# Patient Record
Sex: FEMALE | Race: WHITE
[De-identification: names, ages, dates, MRNs, and addresses within clinical notes are randomized per-mention and may not be internally consistent; named-entity substitution may affect disease eponyms.]

---

## 2019-11-10 ENCOUNTER — HOSPITAL ENCOUNTER (EMERGENCY)
Dept: HOSPITAL 62 - ER | Age: 34
Discharge: HOME | End: 2019-11-10
Payer: MEDICAID

## 2019-11-10 VITALS — DIASTOLIC BLOOD PRESSURE: 84 MMHG | SYSTOLIC BLOOD PRESSURE: 127 MMHG

## 2019-11-10 DIAGNOSIS — M79.672: ICD-10-CM
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
## 2019-11-10 DIAGNOSIS — F17.210: ICD-10-CM

## 2019-11-10 DIAGNOSIS — S99.922A: Primary | ICD-10-CM

## 2019-11-10 PROCEDURE — 73630 X-RAY EXAM OF FOOT: CPT

## 2019-11-10 PROCEDURE — 99283 EMERGENCY DEPT VISIT LOW MDM: CPT

## 2019-11-10 NOTE — ER DOCUMENT REPORT
HPI





- HPI


Patient complains to provider of: left foot pain


Time Seen by Provider: 11/10/19 08:51


Onset: Just prior to arrival


Pain Level: 3


Context: 





This 34-year-old female presents to the emergency department with complaints of 

left foot pain.  Reports she slipped when she was in the bathtub.  She reports 

she had used baby oil yesterday in the tub and forgot about it and slipped and 

hit her left foot against the wall of the bathtub.  She did not hit her head.  

Reports pain immediately pain with walking pain just sitting there.  Denies past

medical history of injury to the foot. 





- REPRODUCTIVE


Reproductive: DENIES: Pregnant:





- MUSCULOSKELETAL


Musculoskeletal: REPORTS: Extremity pain





Past Medical History





- General


Information source: Patient


Last Menstrual Period: A few days ago





- Social History


Smoking Status: Current Every Day Smoker


Cigarette use (# per day): Yes


Frequency of alcohol use: None


Drug Abuse: None


Occupation: homemaker


Lives with: Family


Family History: Reviewed & Not Pertinent


Patient has suicidal ideation: No


Patient has homicidal ideation: No


Neurological Medical History: Reports: Hx Migraine


Renal/ Medical History: Denies: Hx Peritoneal Dialysis


Psychiatric Medical History: Reports: Hx Anxiety


Surgical Hx: Negative





- Immunizations


Immunizations up to date: Yes





Vertical Provider Document





- CONSTITUTIONAL


Agree With Documented VS: Yes


Exam Limitations: No Limitations


General Appearance: WD/WN, Mild Distress - Grimaces





- INFECTION CONTROL


TRAVEL OUTSIDE OF THE [place] IN LAST 30 DAYS: No





- HEENT


HEENT: Atraumatic, Normocephalic





- NECK


Neck: Supple





- RESPIRATORY


Respiratory: No Respiratory Distress





- CARDIOVASCULAR


Cardiovascular: Regular Rate





- MUSCULOSKELETAL/EXTREMETIES


Musculoskeletal/Extremeties: MAEW, FROM, Tender - Left lateral  to 

palpate no obvious deformity good pedal pulse cap refill less than 3 seconds.





- NEURO


Level of Consciousness: Awake, Alert, Appropriate


Motor/Sensory: No Motor Deficit





- DERM


Integumentary: Warm, Dry


Adult Front & Back Diagram: 


                            __________________________














                            __________________________





 1 - pt reports pain








Course





- Re-evaluation


Re-evalutation: 





11/10/19 09:00


34-year-old female with left foot pain after she had onset of the bathtub.  Has 

not taken anything for pain Motrin offered and accepted.  Patient taken to x-

ray.


11/10/19 09:44


                                        





Foot X-Ray  11/10/19 08:54


IMPRESSION:  NEGATIVE STUDY OF THE LEFT FOOT. NO RADIOGRAPHIC EVIDENCE OF ACUTE 

INJURY.


 








X-ray negative.  Patient will be placed in Ace wrap crutches and instructed to 

follow-up with orthopedics for continued pain.





- Vital Signs


Vital signs: 


                                        











Temp Pulse Resp BP Pulse Ox


 


 97.6 F   91   14   125/79   97 


 


 11/10/19 08:37  11/10/19 08:37  11/10/19 08:37  11/10/19 08:37  11/10/19 08:37














- Diagnostic Test


Radiology reviewed: Image reviewed, Reports reviewed





Procedures





- Immobilization


  ** Left Foot


Pre-Proc Neuro Vasc Exam: Normal


Immobilizer type: Ace wrap


Performed by: RICHI Scott


Post-Proc Neuro Vasc Exam: Unchanged from pre-exam


Alignment checked and good: Yes





Discharge





- Discharge


Clinical Impression: 


Injury of left foot


Qualifiers:


 Encounter type: initial encounter Qualified Code(s): S99.922A - Unspecified 

injury of left foot, initial encounter





Condition: Stable


Disposition: HOME, SELF-CARE


Instructions:  Ace Wrap ([facility]), Use of Crutches ([facility]), Use of Over-The-Counter 

Ibuprofen ([facility]), Ice & Elevation ([facility])


Additional Instructions: 


*You have been evaluated for a foot injury


Your foot x-ray was negative for a fracture


*Rest/Ice/Elevate your foot


*Maintain the ace wrap and  use your crutches for the next few days for comfort


*Follow up with your primary care provider within 1 week for referral to 

orthopedics as indicated


*Take ibuprofen as indicated


*Return to ED for worsening condition, changes, needs





Referrals: 


ESTEBAN BARBOUR PA-C [Primary Care Provider] - Follow up in 3-5 days

## 2019-11-10 NOTE — RADIOLOGY REPORT (SQ)
EXAM DESCRIPTION:  FOOT LEFT COMPLETE



COMPLETED DATE/TIME:  11/10/2019 9:02 am



REASON FOR STUDY:  fall pain



COMPARISON:  None.



NUMBER OF VIEWS:  Three views.



TECHNIQUE:  AP, lateral and oblique  radiographic images acquired of the left foot.



LIMITATIONS:  None.



FINDINGS:  MINERALIZATION: Normal.

BONES: No acute fracture or dislocation.  No worrisome bone lesions.

JOINTS: No effusions.

SOFT TISSUES: No soft tissue swelling.  No foreign body.

OTHER: No other significant finding.



IMPRESSION:  NEGATIVE STUDY OF THE LEFT FOOT. NO RADIOGRAPHIC EVIDENCE OF ACUTE INJURY.



TECHNICAL DOCUMENTATION:  JOB ID:  [id number]

 2011 [employer]- All Rights Reserved



Reading location - IP/workstation name: [email or web address]

## 2020-03-16 ENCOUNTER — HOSPITAL ENCOUNTER (EMERGENCY)
Dept: HOSPITAL 62 - ER | Age: 35
Discharge: HOME | End: 2020-03-16
Payer: MEDICAID

## 2020-03-16 VITALS — SYSTOLIC BLOOD PRESSURE: 146 MMHG | DIASTOLIC BLOOD PRESSURE: 80 MMHG

## 2020-03-16 DIAGNOSIS — K08.89: Primary | ICD-10-CM

## 2020-03-16 DIAGNOSIS — Z87.891: ICD-10-CM

## 2020-03-16 PROCEDURE — 99282 EMERGENCY DEPT VISIT SF MDM: CPT

## 2020-03-16 NOTE — ER DOCUMENT REPORT
HPI





- HPI


Time Seen by Provider: 03/16/20 19:21


Pain Level: 4


Notes: 





34-year-old female patient presenting to the emergency department chief 

complaint of dental pain.  Patient reports she has multiple broken teeth and has

$8000 worth of dental work to be completed.  She states she is having pain and 

needs antibiotics.


She denies any fevers.





- REPRODUCTIVE


Reproductive: DENIES: Pregnant:





Past Medical History





- General


Information source: Patient





- Social History


Smoking Status: Former Smoker


Family History: Reviewed & Not Pertinent


Patient has suicidal ideation: No


Patient has homicidal ideation: No


Neurological Medical History: Reports: Hx Migraine


Renal/ Medical History: Denies: Hx Peritoneal Dialysis


Psychiatric Medical History: Reports: Hx Anxiety





- Immunizations


Immunizations up to date: Yes





Vertical Provider Document





- CONSTITUTIONAL


Notes: 





PHYSICAL EXAMINATION:





GENERAL: Well-appearing, well-nourished and in no acute distress.





HEAD: Atraumatic, normocephalic.





EYES: Pupils equal round extraocular movements intact,  conjunctiva are normal.





ENT: Nares patent, poor dentition noted throughout.  Multiple broken teeth noted

throughout.





NECK: Normal range of motion





LUNGS: No respiratory distress





Musculoskeletal: Normal range of motion





NEUROLOGICAL:  Normal speech, normal gait. 





PSYCH: Normal mood, normal affect.





SKIN: Warm, Dry, normal turgor, no rashes or lesions noted.





- INFECTION CONTROL


TRAVEL OUTSIDE OF THE [place] IN LAST 30 DAYS: No





Course





- Re-evaluation


Re-evalutation: 





Patient will be started on antibiotics.  I gave her 1 dose of pain medication 

here in the emergency department.  I instructed her that we do not prescribe 

narcotics for pain.  She will need to follow-up with her dentist for further 

management of her dental pain.








- Vital Signs


Vital signs: 


                                        











Temp Pulse Resp BP Pulse Ox


 


 98.2 F   88   16   146/80 H  96 


 


 03/16/20 19:15  03/16/20 19:15  03/16/20 19:15  03/16/20 19:15  03/16/20 19:15














Discharge





- Discharge


Clinical Impression: 


 Pain, dental





Condition: Stable


Disposition: HOME, SELF-CARE


Additional Instructions: 


You have been seen for dental pain.  It is very important that you follow-up 

with a dentist for definitive care.  Please return if you develop fever greater 

than 101, swelling in your face, vomiting, difficulty breathing or swallowing, 

or any other symptoms that are concerning to you.  For pain you should take 

ibuprofen 800 mg every 8 hours as needed.





[facility]


[phone]


Prescriptions: 


Clindamycin HCl 300 mg PO TID #30 capsule


Referrals: 


ESTEBAN BARBOUR PA-C [Primary Care Provider] - Follow up as needed

## 2020-04-22 ENCOUNTER — HOSPITAL ENCOUNTER (EMERGENCY)
Dept: HOSPITAL 62 - ER | Age: 35
Discharge: HOME | End: 2020-04-22
Payer: MEDICAID

## 2020-04-22 VITALS — SYSTOLIC BLOOD PRESSURE: 141 MMHG | DIASTOLIC BLOOD PRESSURE: 94 MMHG

## 2020-04-22 DIAGNOSIS — K08.89: Primary | ICD-10-CM

## 2020-04-22 DIAGNOSIS — K02.9: ICD-10-CM

## 2020-04-22 PROCEDURE — 99282 EMERGENCY DEPT VISIT SF MDM: CPT

## 2020-04-22 NOTE — ER DOCUMENT REPORT
ED Oral Problem





- General


Chief Complaint: Toothache


Stated Complaint: TOOTHACHE - RIGHT SIDE


Time Seen by Provider: 04/22/20 14:13


Primary Care Provider: 


ESTEBAN BARBOUR PA-C [Primary Care Provider] - Follow up as needed


Mode of Arrival: Ambulatory


Information source: Patient


Notes: 





34-year-old female presented to ED for complaint of painful tooth on the lower 

right side.  She has multiple decayed teeth.  She states this is because she has

a extreme fear of dentist and has not been going to the dentist due to this 

fear.  She states she was told by her dentist that when you get a cavity in one 

tooth that it causes all of the other 2 tooth decay.  She states she broke the 

tooth that is painful now yesterday.  The cavity was already in the tooth before

it was broken.  She states she does have a appointment with a dentist in [place] next Thursday due to the coronavirus she cannot get a dental appointment 

before then.  Patient has been treated with 1 Norco in the emergency room, 

clindamycin in the emergency room and a prescription for clindamycin and viscous

lidocaine for her discomfort.  Patient will be discharged home after this 

treatment.


TRAVEL OUTSIDE OF THE [place] IN LAST 30 DAYS: No





- HPI


Patient complains to provider of: Toothache


Onset: Yesterday


Onset: Gradual


Quality of pain: Throbbing


Severity: Moderate


Pain Level: 4


Sore throat: Moderate


Swollen jaw/face: Moderate


Associated symptoms: Toothache


Worsened by: Cold


Relieved by: Nothing


Similar symptoms previously: Yes


Recently seen / treated by doctor/dentist: No





- Related Data


Allergies/Adverse Reactions: 


                                        





Penicillins Allergy (Verified 04/22/20 14:00)


   








Home Medications: paxil, synthroid





Past Medical History





- Social History


Smoking Status: Never Smoker


Chew tobacco use (# tins/day): No


Frequency of alcohol use: None


Drug Abuse: None


Lives with: Family


Family History: Reviewed & Not Pertinent


Patient has suicidal ideation: No


Patient has homicidal ideation: No





- Past Medical History


Cardiac Medical History: Reports: None


Pulmonary Medical History: Reports: None


EENT Medical History: Reports: None


Neurological Medical History: Reports: Hx Migraine


Endocrine Medical History: Reports: Hx Hypothyroidism


Renal/ Medical History: Reports: None


Malignancy Medical History: Reports: None


GI Medical History: Reports: None


Musculoskeletal Medical History: Reports None


Skin Medical History: Reports None


Psychiatric Medical History: Reports: Hx Anxiety


Traumatic Medical History: Reports: None


Infectious Medical History: Reports: None


Surgical Hx: Negative


Past Surgical History: Reports: None





- Immunizations


Immunizations up to date: Yes





Review of Systems





- Review of Systems


Constitutional: No symptoms reported


EENT: Dental problem


Cardiovascular: No symptoms reported


Respiratory: No symptoms reported


Gastrointestinal: No symptoms reported


Genitourinary: No symptoms reported


Female Genitourinary: No symptoms reported


Musculoskeletal: No symptoms reported


Skin: No symptoms reported


Hematologic/Lymphatic: No symptoms reported


Neurological/Psychological: No symptoms reported





Physical Exam





- Vital signs


Vitals: 


                                        











Temp Pulse Resp BP Pulse Ox


 


 98.4 F   66   18   153/98 H  100 


 


 04/22/20 13:39  04/22/20 13:39  04/22/20 13:39  04/22/20 13:39  04/22/20 13:39











Interpretation: Normal





- General


General appearance: Appears well, Alert





- HEENT


Head: Normocephalic, Atraumatic


Eyes: Normal


Pupils: PERRL


Ears: Normal


External canal: Normal


Tympanic membrane: Normal


Sinus: Normal


Nasal: Normal


Mouth/Lips: Caries


Mucous membranes: Normal


Teeth diagram: 


                            __________________________














                            __________________________





 1 - All of her teeth are very decayed she has one tooth in the bottom right 

that is more painful as it just recently broke





Pharynx: Normal


Neck: Normal





- Respiratory


Respiratory status: No respiratory distress


Chest status: Nontender


Breath sounds: Normal


Chest palpation: Normal





- Cardiovascular


Rhythm: Regular


Heart sounds: Normal auscultation


Murmur: No





- Abdominal


Inspection: Normal


Distension: No distension


Bowel sounds: Normal


Tenderness: Nontender


Organomegaly: No organomegaly





- Back


Back: Normal, Nontender





- Extremities


General upper extremity: Normal inspection, Nontender, Normal color, Normal ROM,

Normal temperature


General lower extremity: Normal inspection, Nontender, Normal color, Normal ROM,

Normal temperature, Normal weight bearing.  No: Jocelynn's sign





- Neurological


Neuro grossly intact: Yes


Cognition: Normal


Orientation: AAOx4


[place] Coma Scale Eye Opening: Spontaneous


[place] Coma Scale Verbal: Oriented


[place] Coma Scale Motor: Obeys Commands


Janell Coma Scale Total: 15


Speech: Normal


Motor strength normal: LUE, RUE, LLE, RLE


Sensory: Normal





- Psychological


Associated symptoms: Normal affect, Normal mood





- Skin


Skin Temperature: Warm


Skin Moisture: Dry


Skin Color: Normal





Course





- Vital Signs


Vital signs: 


                                        











Temp Pulse Resp BP Pulse Ox


 


 98.4 F   66   18   141/94 H  100 


 


 04/22/20 14:01  04/22/20 13:39  04/22/20 13:39  04/22/20 14:20  04/22/20 13:39














Discharge





- Discharge


Clinical Impression: 


 Pain due to dental caries





Condition: Stable


Disposition: HOME, SELF-CARE


Additional Instructions: 


TOOTHACHE:





     Your pain is due to dental decay.  The tooth must be repaired in order for 

you to feel better.  You will, therefore, be referred to a dentist.  We do not 

have dentists on the staff at [facility].


     Severe swelling or drainage around a tooth usually means a dental abscess. 

This also requires evaluation and treatment by the dentist, but antibiotics may 

be prescribed while awaiting dental treatment.


     You should be rechecked immediately if you develop major swelling of the 

face, increasing pain, a lump in the jaw or gums, headache, difficulty 

swallowing, or fever.








ORAL NARCOTIC MEDICATION:


     You have been given a norco for pain control.  This medication is a 

narcotic.  It's best taken with food, as nausea can result if taken on an empty 

stomach.


     Don't operate machinery or drive within six hours of taking this 

medication.  Do not combine this medicine with alcohol, or with any medication 

which can cause sedation (such as cold tablets or sleeping pills) unless you get

permission from the physician.


     Narcotics tend to cause constipation.  If possible, drink plenty of fluids 

and eat a diet high in fiber and fruits.





     Please be aware that prescription narcotics also have the potential for 

abuse.  People become addicted to these medications because of the general sense

of wellbeing that they induce.  This feeling along with a significant reduction 

in tension, anxiety, and aggression provides a stimulating seductive quality to 

these drugs.  Once your pain is under control, we encourage you to discard your 

unused narcotics.





CLINDAMYCIN:


     You have been given a prescription for the antibiotic clindamycin.  It is 

often prescribed for infections in the mouth, such as dental infections or 

abscesses, and for skin infections due to MRSA.  It's important that you take 

all the medication, unless instructed otherwise by your physician.  Failure to 

complete the entire course can result in relapse of your condition.


     Common side effects of antibiotics include nausea, intestinal cramping, or 

diarrhea.  Women may develop vaginal yeast infections, and babies can get yeast 

(thrush) in the mouth following the use of antibiotics.  Contact your physician 

if you develop significant side effects from this medication.


     Allergy to this antibiotic can result in hives, wheezing, faintness, or 

itching.  If symptoms of allergy occur, stop the medication and call the doctor.





You were given a syringe of viscous lidocaine.  Place a small amount of this on 

your finger and apply it to the tooth that is painful.  You can do this every 4 

hours if you do it more often than every 4 hours it will erode the skin on your 

gums.  This will make the pain worse.


FOLLOW-UP CARE:


     You have been referred for follow-up care to the dentists listed below.  

Call the dentists office for an appointment as you were instructed or within 

the next two days.  If you experience worsening or a significant change in your 

symptoms, notify the physician immediately or return to the Emergency Department

at any time for re-evaluation.








[facility]


[address] [place] [address] [phone]





[facility]


[address]


[phone]





[facility]


[address] [facility] [facility] [address] [facility]


[phone]





[address] [place]


[phone]


[email or web address]





[place] [address] [place]  [address] [phone]


Monday-Thursdays  8:00am to 5:00 pm


Will see patients from other [facility].


Charges based on income and family size and


accepts Medicare, Medicaid, and Insurances


Will pull molars





[facility] [employer]


Student Clinics


[facility] [place] [address] [phone]


Hours of Operation


   8:00 am - 4:30 pm weekdays





The following dental offices accept Medicaid:





   [facility]   [phone]


   Dr. Hopkins         [phone]


   Dr. Pruitt         [phone]


   Dr. Ruano         [phone]


   Dr. Isabel         [phone]


   [facility]


      oral surgery      [phone]


   Dr. Parker ([place])      [phone]


   Dr. Holley ([place])   [phone]


   [facility]      [phone]


   [facility] ([place])   [phone]


   Dr. Lee ([place])      [phone]


   [facility]      [phone]


   [facility]   [phone]


   [facility]   [phone]


   Dr. Manzo ([place])      [phone]


   [facility] 


      ([place])      [phone]





   Medicaid Care Line      [phone]


Prescriptions: 


Clindamycin HCl 300 mg PO TID #30 capsule


Ibuprofen [Motrin 800 mg Tablet] 800 mg PO Q8H PRN #20 tab


 PRN Reason: 


Forms:  Elevated Blood Pressure


Referrals: 


ESTEBAN BARBOUR PA-C [Primary Care Provider] - Follow up as needed